# Patient Record
Sex: FEMALE | NOT HISPANIC OR LATINO | Employment: STUDENT | URBAN - METROPOLITAN AREA
[De-identification: names, ages, dates, MRNs, and addresses within clinical notes are randomized per-mention and may not be internally consistent; named-entity substitution may affect disease eponyms.]

---

## 2022-04-26 ENCOUNTER — EVALUATION (OUTPATIENT)
Dept: PHYSICAL THERAPY | Facility: CLINIC | Age: 22
End: 2022-04-26
Payer: COMMERCIAL

## 2022-04-26 DIAGNOSIS — M54.50 ACUTE BILATERAL LOW BACK PAIN WITHOUT SCIATICA: Primary | ICD-10-CM

## 2022-04-26 PROCEDURE — 97162 PT EVAL MOD COMPLEX 30 MIN: CPT | Performed by: PHYSICAL THERAPIST

## 2022-04-26 RX ORDER — QUETIAPINE FUMARATE 200 MG/1
200 TABLET, FILM COATED ORAL
COMMUNITY

## 2022-04-26 RX ORDER — IBUPROFEN 200 MG
TABLET ORAL EVERY 6 HOURS PRN
COMMUNITY

## 2022-04-26 RX ORDER — ESCITALOPRAM OXALATE 10 MG/1
10 TABLET ORAL DAILY
COMMUNITY

## 2022-04-26 NOTE — LETTER
2022    Norbert Richard MD  [address] [place] [id number]    Patient: Maria A Bright   YOB: 2000   Date of Visit: 2022     Encounter Diagnosis     ICD-10-CM    1  Acute bilateral low back pain without sciatica  M54 50        Dear Dr Pilar Porras:    Thank you for your recent referral of Maria A Bright  Please review the attached evaluation summary from Aleena's recent visit  Please verify that you agree with the plan of care by signing the attached order  If you have any questions or concerns, please do not hesitate to call  I sincerely appreciate the opportunity to share in the care of one of your patients and hope to have another opportunity to work with you in the near future  Sincerely,    Lisa Peterson PT      Referring Provider:      I certify that I have read the below Plan of Care and certify the need for these services furnished under this plan of treatment while under my care  Norbert Richard MD  [address] [place] [id number]  Via Fax: [phone]          PT Evaluation     Today's date: 2022  Patient name: Maria A Bright  : 2000  MRN: [id number]  Referring provider: Mariella Gomez MD  Dx:   Encounter Diagnosis     ICD-10-CM    1  Acute bilateral low back pain without sciatica  M54 50        Start Time: 915  Stop Time: 1000  Total time in clinic (min): 45 minutes    Assessment  Assessment details: Maria A Bright is a pleasant 25 y o  female who presents with low back pain, increased with sitting and studying  The primary movement problem is lumbar hypomobility resulting in pain and limiting her ability to sit and study without limitation  No further referral appears necessary at this time based upon examination results  I expect she will be able to meet ling term goals at time of discharge pending compliance with HEP and PT POC  The patient's greatest concerns are the pain she is experiencing and worry over not knowing what's wrong  Problem List:  1) lumbar hypomobility  2) low back pain    Etiologic factors include recent increase in studying  Impairments: abnormal coordination, abnormal muscle tone, abnormal or restricted ROM, activity intolerance, impaired physical strength and lacks appropriate home exercise program    Symptom irritability: moderatePrognosis details: Positive prognostic indicators include positive attitude toward recovery, good understanding of diagnosis and treatment plan options, absence of peripheralization and absence of observed red flags  Negative prognostic indicators include limited duration of treatment due to patient leaving for [facility] for the summer in a few weeks        Goals  (STG) Impairment Goals 4-6 weeks  - Decrease pain to <1/10  - Improve lumbar AROM to 100%  - Increase hip strength to 4+/5 throughout  - Increase core strength to be able to sit straight up without deviation    (LTG) Functional Goals 6-8 weeks  - Return to Prior Level of Function  - Increase Functional Status Measure (FOTO) to: predicted outcome  - Patient will be independent with HEP  - Patient will be able to sit without increased pain/compensation/difficulty  - Patient will be able to perform sit to stand without increased pain/compensation/difficulty   - Patient will be able to bend forward without increased pain/compensation/difficulty   - Patient will have full loaded extension without pain  - Patient will be able to study without pain      Plan  Patient would benefit from: skilled physical therapy  Referral necessary: No  Planned modality interventions: cryotherapy, TENS and thermotherapy: hydrocollator packs  Planned therapy interventions: abdominal trunk stabilization, flexibility, functional ROM exercises, home exercise program, therapeutic exercise, therapeutic activities, stretching, strengthening, patient education, massage, manual therapy and joint mobilization  Frequency: 2x week  Duration in weeks: 6  Treatment plan discussed with: patient        Subjective Evaluation    History of Present Illness  Mechanism of injury: 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[employer], she is teresa   HOME LIFE: Patient is from [place] but plans to move back to [facility] when the ester when she is done   PLOF/History: About a month ago, her back started hurting very minimally and then it would go away  More recently, it has gotten worse, 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  Pain primarily stays at her back, but sometimes she has some numbness in her R knee and R upper back near her shoulder  PAIN LOCATION/DESCRIPTORS: central low back, described as nagging and bearable   AGGRAVATING FACTORS: sitting, lying on a soft surface, bending forward    EASES: Ibuprofen 3x/day, lying on her back on a firm surface, standing   DAY PATTERN: pain everyday  IMAGING: none  SPECIAL QUESTIONS: denies a feeling of weakness in legs   Charles Net denies a new onset of Bladder incontinence, Bowel dysfunction, Recent unexplained weight loss, Constant night pain and History of cancer  PATIENT GOALS: pain relief, be able to do her school work with less pain    Pain  Current pain ratin  At best pain ratin  At worst pain ratin  Quality: throbbing and dull ache  Relieving factors: rest, relaxation, medications and change in position  Aggravating factors: sitting  Progression: worsening    Patient Goals  Patient goals for therapy: decreased pain          Objective     Tenderness     Lumbar Spine  Tenderness in the spinous process (L3-5)       Additional Tenderness Details  R iliopsoas has increased tone, improved with grade 5 thoracic mobilization    Neurological Testing     Sensation     Lumbar   Left   Intact: light touch    Right   Intact: light touch    Reflexes   Left   Patellar (L4): normal (2+)  Achilles (S1): normal (2+)    Right   Patellar (L4): normal (2+)  Achilles (S1): normal (2+)    Active Range of Motion     Additional Active Range of Motion Details  LS AROM:   Flexion: 75%  Extension: 25%  SideBending right: 50%  SideBending left: 50%  Rotation right: 50%  Rotation left: 50%    Decreased curve reversal of lumbar spine with flexion  Limited loaded extension due to fear of movement      Muscle Activation   Patient unable to activate left multifidus and right multifidus                Diagnosis: Low Back Pain   Precautions: none   Primary Goals: Be able to sit and study without pain   *asterisks by exercise = given for HEP   Manuals 4/26       Grade 5 Thoracic PA mobilization LB                                       There Ex        TM/elliptical        Open books        LTR        Multifidus press        bird-dogs        Bridges from p-ODEC                                Neuro Re-Ed        Pelvic tilts        Multifidus activation* 10" 10x       Pelvic clocks on p-ODEC        abd bracing                                                                 Education Studying in extension position, lumbar roll        Re-evaluation              Ther Act                                         Modalities

## 2022-04-26 NOTE — LETTER
2022    Emy Maya MD  [address] [id number]    Patient: Odalys Topete   YOB: 2000   Date of Visit: 2022     Encounter Diagnosis     ICD-10-CM    1  Acute bilateral low back pain without sciatica  M54 50        Dear Dr Pitt Bending:    Thank you for your recent referral of Odalys Topete  Please review the attached evaluation summary from Aleena's recent visit  Please verify that you agree with the plan of care by signing the attached order  If you have any questions or concerns, please do not hesitate to call  I sincerely appreciate the opportunity to share in the care of one of your patients and hope to have another opportunity to work with you in the near future  Sincerely,    Jose Liu PT      Referring Provider:      I certify that I have read the below Plan of Care and certify the need for these services furnished under this plan of treatment while under my care  Emy Maya MD  [address] [facility]  Via Fax: [phone]          PT Evaluation     Today's date: 2022  Patient name: Odalys Topete  : 2000  MRN: [id number]  Referring provider: Cristhian Duarte MD  Dx:   Encounter Diagnosis     ICD-10-CM    1  Acute bilateral low back pain without sciatica  M54 50        Start Time: 0915  Stop Time: 1000  Total time in clinic (min): 45 minutes    Assessment  Assessment details: Odalys Topete is a pleasant 25 y o  female who presents with low back pain, increased with sitting and studying  The primary movement problem is lumbar hypomobility resulting in pain and limiting her ability to sit and study without limitation  No further referral appears necessary at this time based upon examination results  I expect she will be able to meet ling term goals at time of discharge pending compliance with HEP and PT POC    The patient's greatest concerns are the pain she is experiencing and worry over not knowing what's wrong  Problem List:  1) lumbar hypomobility  2) low back pain    Etiologic factors include recent increase in studying  Impairments: abnormal coordination, abnormal muscle tone, abnormal or restricted ROM, activity intolerance, impaired physical strength and lacks appropriate home exercise program    Symptom irritability: moderatePrognosis details: Positive prognostic indicators include positive attitude toward recovery, good understanding of diagnosis and treatment plan options, absence of peripheralization and absence of observed red flags  Negative prognostic indicators include limited duration of treatment due to patient leaving for [facility] for the summer in a few weeks        Goals  (STG) Impairment Goals 4-6 weeks  - Decrease pain to <1/10  - Improve lumbar AROM to 100%  - Increase hip strength to 4+/5 throughout  - Increase core strength to be able to sit straight up without deviation    (LTG) Functional Goals 6-8 weeks  - Return to Prior Level of Function  - Increase Functional Status Measure (FOTO) to: predicted outcome  - Patient will be independent with HEP  - Patient will be able to sit without increased pain/compensation/difficulty  - Patient will be able to perform sit to stand without increased pain/compensation/difficulty   - Patient will be able to bend forward without increased pain/compensation/difficulty   - Patient will have full loaded extension without pain  - Patient will be able to study without pain      Plan  Patient would benefit from: skilled physical therapy  Referral necessary: No  Planned modality interventions: cryotherapy, TENS and thermotherapy: hydrocollator packs  Planned therapy interventions: abdominal trunk stabilization, flexibility, functional ROM exercises, home exercise program, therapeutic exercise, therapeutic activities, stretching, strengthening, patient education, massage, manual therapy and joint mobilization  Frequency: 2x week  Duration in weeks: 6  Treatment plan discussed with: patient        Subjective Evaluation    History of Present Illness  Mechanism of injury: 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[employer], she is teresa   HOME LIFE: Patient is from [place] but plans to move back to [place] when the semester when she is done   PLOF/History: About a month ago, her back started hurting very minimally and then it would go away  More recently, it has gotten worse, 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  Pain primarily stays at her back, but sometimes she has some numbness in her R knee and R upper back near her shoulder  PAIN LOCATION/DESCRIPTORS: central low back, described as nagging and bearable   AGGRAVATING FACTORS: sitting, lying on a soft surface, bending forward    EASES: Ibuprofen 3x/day, lying on her back on a firm surface, standing   DAY PATTERN: pain everyday  IMAGING: none  SPECIAL QUESTIONS: denies a feeling of weakness in legs   Joaquin Aid denies a new onset of Bladder incontinence, Bowel dysfunction, Recent unexplained weight loss, Constant night pain and History of cancer  PATIENT GOALS: pain relief, be able to do her school work with less pain    Pain  Current pain ratin  At best pain ratin  At worst pain ratin  Quality: throbbing and dull ache  Relieving factors: rest, relaxation, medications and change in position  Aggravating factors: sitting  Progression: worsening    Patient Goals  Patient goals for therapy: decreased pain          Objective     Tenderness     Lumbar Spine  Tenderness in the spinous process (L3-5)       Additional Tenderness Details  R iliopsoas has increased tone, improved with grade 5 thoracic mobilization    Neurological Testing     Sensation     Lumbar   Left   Intact: light touch    Right   Intact: light touch    Reflexes   Left   Patellar (L4): normal (2+)  Achilles (S1): normal (2+)    Right   Patellar (L4): normal (2+)  Achilles (S1): normal (2+)    Active Range of Motion Additional Active Range of Motion Details  LS AROM:   Flexion: 75%  Extension: 25%  SideBending right: 50%  SideBending left: 50%  Rotation right: 50%  Rotation left: 50%    Decreased curve reversal of lumbar spine with flexion  Limited loaded extension due to fear of movement      Muscle Activation   Patient unable to activate left multifidus and right multifidus                Diagnosis: Low Back Pain   Precautions: none   Primary Goals: Be able to sit and study without pain   *asterisks by exercise = given for HEP   Manuals 4/26       Grade 5 Thoracic PA mobilization LB                                       There Ex        TM/elliptical        Open books        LTR        Multifidus press        bird-dogs        Bridges from p-ball                                Neuro Re-Ed        Pelvic tilts        Multifidus activation* 10" 10x       Pelvic clocks on p-ball        abd bracing                                                                 Education Studying in extension position, lumbar roll        Re-evaluation              Ther Act                                         Modalities

## 2022-04-26 NOTE — PROGRESS NOTES
PT Evaluation     Today's date: 2022  Patient name: Radha Wheeler  : 2000  MRN: [id number]  Referring provider: Dianne Correia MD  Dx:   Encounter Diagnosis     ICD-10-CM    1  Acute bilateral low back pain without sciatica  M54 50        Start Time: 915  Stop Time: 1000  Total time in clinic (min): 45 minutes    Assessment  Assessment details: Radha Wheeler is a pleasant 25 y o  female who presents with low back pain, increased with sitting and studying  The primary movement problem is lumbar hypomobility resulting in pain and limiting her ability to sit and study without limitation  No further referral appears necessary at this time based upon examination results  I expect she will be able to meet ling term goals at time of discharge pending compliance with HEP and PT POC  The patient's greatest concerns are the pain she is experiencing and worry over not knowing what's wrong  Problem List:  1) lumbar hypomobility  2) low back pain    Etiologic factors include recent increase in studying  Impairments: abnormal coordination, abnormal muscle tone, abnormal or restricted ROM, activity intolerance, impaired physical strength and lacks appropriate home exercise program    Symptom irritability: moderatePrognosis details: Positive prognostic indicators include positive attitude toward recovery, good understanding of diagnosis and treatment plan options, absence of peripheralization and absence of observed red flags  Negative prognostic indicators include limited duration of treatment due to patient leaving for [facility] for the summer in a few weeks        Goals  (STG) Impairment Goals 4-6 weeks  - Decrease pain to <1/10  - Improve lumbar AROM to 100%  - Increase hip strength to 4+/5 throughout  - Increase core strength to be able to sit straight up without deviation    (LTG) Functional Goals 6-8 weeks  - Return to Prior Level of Function  - Increase Functional Status Measure (FOTO) to: predicted outcome  - Patient will be independent with HEP  - Patient will be able to sit without increased pain/compensation/difficulty  - Patient will be able to perform sit to stand without increased pain/compensation/difficulty   - Patient will be able to bend forward without increased pain/compensation/difficulty   - Patient will have full loaded extension without pain  - Patient will be able to study without pain      Plan  Patient would benefit from: skilled physical therapy  Referral necessary: No  Planned modality interventions: cryotherapy, TENS and thermotherapy: hydrocollator packs  Planned therapy interventions: abdominal trunk stabilization, flexibility, functional ROM exercises, home exercise program, therapeutic exercise, therapeutic activities, stretching, strengthening, patient education, massage, manual therapy and joint mobilization  Frequency: 2x week  Duration in weeks: 6  Treatment plan discussed with: patient        Subjective Evaluation    History of Present Illness  Mechanism of injury: 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[employer], she is teresa   HOME LIFE: Patient is from [place] but plans to move back to [place] when the semester when she is done   PLOF/History: About a month ago, her back started hurting very minimally and then it would go away  More recently, it has gotten worse, 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  Pain primarily stays at her back, but sometimes she has some numbness in her R knee and R upper back near her shoulder     PAIN LOCATION/DESCRIPTORS: central low back, described as nagging and bearable   AGGRAVATING FACTORS: sitting, lying on a soft surface, bending forward    EASES: Ibuprofen 3x/day, lying on her back on a firm surface, standing   DAY PATTERN: pain everyday  IMAGING: none  SPECIAL QUESTIONS: denies a feeling of weakness in legs   Jeannie Evans denies a new onset of Bladder incontinence, Bowel dysfunction, Recent unexplained weight loss, Constant night pain and History of cancer  PATIENT GOALS: pain relief, be able to do her school work with less pain    Pain  Current pain ratin  At best pain ratin  At worst pain ratin  Quality: throbbing and dull ache  Relieving factors: rest, relaxation, medications and change in position  Aggravating factors: sitting  Progression: worsening    Patient Goals  Patient goals for therapy: decreased pain          Objective     Tenderness     Lumbar Spine  Tenderness in the spinous process (L3-5)  Additional Tenderness Details  R iliopsoas has increased tone, improved with grade 5 thoracic mobilization    Neurological Testing     Sensation     Lumbar   Left   Intact: light touch    Right   Intact: light touch    Reflexes   Left   Patellar (L4): normal (2+)  Achilles (S1): normal (2+)    Right   Patellar (L4): normal (2+)  Achilles (S1): normal (2+)    Active Range of Motion     Additional Active Range of Motion Details  LS AROM:   Flexion: 75%  Extension: 25%  SideBending right: 50%  SideBending left: 50%  Rotation right: 50%  Rotation left: 50%    Decreased curve reversal of lumbar spine with flexion  Limited loaded extension due to fear of movement      Muscle Activation   Patient unable to activate left multifidus and right multifidus                Diagnosis: Low Back Pain   Precautions: none   Primary Goals: Be able to sit and study without pain   *asterisks by exercise = given for HEP   Manuals        Grade 5 Thoracic PA mobilization LB                                       There Ex        TM/elliptical        Open books        LTR        Multifidus press        bird-dogs        Bridges from p-ball                                Neuro Re-Ed        Pelvic tilts        Multifidus activation* 10" 10x       Pelvic clocks on p-ball        abd bracing                                                                 Education Studying in extension position, lumbar roll        Re-evaluation              Ther Act Modalities

## 2022-04-27 ENCOUNTER — TRANSCRIBE ORDERS (OUTPATIENT)
Dept: PHYSICAL THERAPY | Facility: CLINIC | Age: 22
End: 2022-04-27

## 2022-04-27 DIAGNOSIS — M54.50 ACUTE BILATERAL LOW BACK PAIN WITHOUT SCIATICA: Primary | ICD-10-CM

## 2022-04-29 ENCOUNTER — APPOINTMENT (OUTPATIENT)
Dept: RADIOLOGY | Facility: CLINIC | Age: 22
End: 2022-04-29
Payer: COMMERCIAL

## 2022-04-29 ENCOUNTER — OFFICE VISIT (OUTPATIENT)
Dept: PHYSICAL THERAPY | Facility: CLINIC | Age: 22
End: 2022-04-29
Payer: COMMERCIAL

## 2022-04-29 DIAGNOSIS — M54.50 ACUTE BILATERAL LOW BACK PAIN WITHOUT SCIATICA: Primary | ICD-10-CM

## 2022-04-29 DIAGNOSIS — M54.50 ACUTE BILATERAL LOW BACK PAIN WITHOUT SCIATICA: ICD-10-CM

## 2022-04-29 PROCEDURE — 97110 THERAPEUTIC EXERCISES: CPT | Performed by: PHYSICAL THERAPIST

## 2022-04-29 PROCEDURE — 97112 NEUROMUSCULAR REEDUCATION: CPT | Performed by: PHYSICAL THERAPIST

## 2022-04-29 PROCEDURE — 97140 MANUAL THERAPY 1/> REGIONS: CPT | Performed by: PHYSICAL THERAPIST

## 2022-04-29 PROCEDURE — 72110 X-RAY EXAM L-2 SPINE 4/>VWS: CPT

## 2022-04-29 NOTE — PROGRESS NOTES
Daily Note     Today's date: 2022  Patient name: Colten King  : 2000  MRN: [id number]  Referring provider: Gloria Miramontes MD  Dx:   Encounter Diagnosis     ICD-10-CM    1  Acute bilateral low back pain without sciatica  M54 50        Start Time: 1515  Stop Time: 1600  Total time in clinic (min): 45 minutes    Subjective: Patient reports that her back is feeling better since last session  Says that she is able to sit and study for longer without pain when she uses the towel roll  Says she still gets some pain when she sits for a very long time without moving  Feeling better overall  Objective: See treatment diary below      Assessment: Tolerated treatment well  Patient demonstrated fatigue post treatment, exhibited good technique with therapeutic exercises and would benefit from continued PT  Patient has improved in low back pain since last session, and notes even greater improvement post session today  Added in some core stabilization exercises today which were tolerated well  Will continue to progress as tolerated  Plan: Continue per plan of care  Progress treatment as tolerated         Diagnosis: Low Back Pain   Precautions: none   Primary Goals: Be able to sit and study without pain   *asterisks by exercise = given for HEP   Manuals       Grade 5 Thoracic PA mobilization LB       Gr 3 lumbar mobilizations in prone  LB                              There Ex        TM/elliptical  Elliptical 5 mins      Open books  15x ea      LTR  20x      Multifidus press  8# with walkout 2 laps ea      bird-dogs  2x10      Bridges from p-ball  5" 20x                               Neuro Re-Ed        Pelvic tilts        Multifidus activation* 10" 10x       Pelvic clocks on p-ball        abd bracing                                                                 Education Studying in extension position, lumbar roll        Re-evaluation              Ther Act Modalities

## 2022-05-03 ENCOUNTER — OFFICE VISIT (OUTPATIENT)
Dept: PHYSICAL THERAPY | Facility: CLINIC | Age: 22
End: 2022-05-03
Payer: COMMERCIAL

## 2022-05-03 DIAGNOSIS — M54.50 ACUTE BILATERAL LOW BACK PAIN WITHOUT SCIATICA: Primary | ICD-10-CM

## 2022-05-03 PROCEDURE — 97140 MANUAL THERAPY 1/> REGIONS: CPT | Performed by: PHYSICAL THERAPIST

## 2022-05-03 PROCEDURE — 97110 THERAPEUTIC EXERCISES: CPT | Performed by: PHYSICAL THERAPIST

## 2022-05-03 NOTE — PROGRESS NOTES
Daily Note     Today's date: 5/3/2022  Patient name: Tom Lo  : 2000  MRN: [id number]  Referring provider: Angela Davies MD  Dx:   Encounter Diagnosis     ICD-10-CM    1  Acute bilateral low back pain without sciatica  M54 50        Start Time: 1000  Stop Time: 1045  Total time in clinic (min): 45 minutes    Subjective: Patient reports that she is feeling a lot better overall and that she felt good following last session  Objective: See treatment diary below      Assessment: Tolerated treatment well  Patient demonstrated fatigue post treatment and would benefit from continued PT  Patient was able to demonstrate improvements in core activation and strength with exercise progressions today  Will continue to progress as able  Plan: Continue per plan of care  Progress treatment as tolerated         Diagnosis: Low Back Pain   Precautions: none   Primary Goals: Be able to sit and study without pain   *asterisks by exercise = given for HEP   Manuals  5/3     Grade 5 Thoracic PA mobilization LB       Gr 3 lumbar mobilizations in prone  LB LB                             There Ex        TM/elliptical  Elliptical 5 mins Elliptical 5 mins     Open books  15x ea 15x ea     LTR  20x 20x     Multifidus press  8# with walkout 2 laps ea NV     bird-dogs  2x10 2x10     Bridges from p-ball  5" 20x  5" 20x     planks   NV     X-walks   NV     Alt leg extensions   NV     Neuro Re-Ed        Pelvic tilts  20x 20x      Multifidus activation* 10" 10x       Pelvic clocks on p-ball  10x      abd bracing with alt leg extensions   2x10                                                              Education Studying in extension position, lumbar roll        Re-evaluation              Ther Act                                         Modalities

## 2022-05-06 ENCOUNTER — APPOINTMENT (OUTPATIENT)
Dept: PHYSICAL THERAPY | Facility: CLINIC | Age: 22
End: 2022-05-06
Payer: COMMERCIAL

## 2022-05-10 ENCOUNTER — OFFICE VISIT (OUTPATIENT)
Dept: PHYSICAL THERAPY | Facility: CLINIC | Age: 22
End: 2022-05-10
Payer: COMMERCIAL

## 2022-05-10 DIAGNOSIS — M54.50 ACUTE BILATERAL LOW BACK PAIN WITHOUT SCIATICA: Primary | ICD-10-CM

## 2022-05-10 PROCEDURE — 97110 THERAPEUTIC EXERCISES: CPT

## 2022-05-10 PROCEDURE — 97112 NEUROMUSCULAR REEDUCATION: CPT

## 2022-05-10 NOTE — PROGRESS NOTES
Daily Note     Today's date: 5/10/2022  Patient name: Cesar Camilo  : 2000  MRN: [id number]  Referring provider: Kathy Rao MD  Dx:   Encounter Diagnosis     ICD-10-CM    1  Acute bilateral low back pain without sciatica  M54 50                   Subjective: Patient reports her back has been starting to feel better  She denies significant pain at beginning of session and states she has only had significant pain when bending over recently  Objective: See treatment diary below      Assessment: Tolerated treatment well with no reports of increased back pain  Patient required moderate verbal cues to perform TE with appropriate technique and intensity  Patient most challenged by multifidus press with walkout today  Patient would benefit from continued PT to increase trunk mobility and core strength for improved function in ADLs  Plan: Continue per plan of care        Diagnosis: Low Back Pain   Precautions: none   Primary Goals: Be able to sit and study without pain   *asterisks by exercise = given for HEP   Manuals 4/26 4/29 5/3 5/10    Grade 5 Thoracic PA mobilization LB       Gr 3 lumbar mobilizations in prone  LB LB                             There Ex        TM/elliptical  Elliptical 5 mins Elliptical 5 mins Elliptical 5 mins    Open books  15x ea 15x ea 15x ea    LTR  20x 20x 20x    Multifidus press  8# with walkout 2 laps ea NV 10# with walkout 3 laps ea    bird-dogs  2x10 2x10 2x10    Bridges from p-ball  5" 20x  5" 20x 5" x30    planks   NV Side half planks 3x10" ea    X-walks   NV Green 3 laps    Alt leg extensions   NV 20x ea    Neuro Re-Ed        Pelvic tilts  20x 20x  5"x20    PPT bicycle c ball    3x max    Multifidus activation* 10" 10x       Pelvic clocks on p-ball  10x      abd bracing with alt leg extensions   2x10                                                              Education Studying in extension position, lumbar roll        Re-evaluation              Ther Act Modalities

## 2022-05-13 ENCOUNTER — APPOINTMENT (OUTPATIENT)
Dept: PHYSICAL THERAPY | Facility: CLINIC | Age: 22
End: 2022-05-13
Payer: COMMERCIAL

## 2022-09-09 ENCOUNTER — TRANSCRIBE ORDERS (OUTPATIENT)
Dept: URGENT CARE | Facility: CLINIC | Age: 22
End: 2022-09-09

## 2022-09-09 ENCOUNTER — HOSPITAL ENCOUNTER (OUTPATIENT)
Dept: RADIOLOGY | Facility: HOSPITAL | Age: 22
Discharge: HOME | End: 2022-09-09
Payer: COMMERCIAL

## 2022-09-09 ENCOUNTER — APPOINTMENT (OUTPATIENT)
Dept: RADIOLOGY | Facility: CLINIC | Age: 22
End: 2022-09-09
Payer: COMMERCIAL

## 2022-09-09 DIAGNOSIS — M25.571 ACUTE RIGHT ANKLE PAIN: ICD-10-CM

## 2022-09-09 DIAGNOSIS — M25.571 ACUTE RIGHT ANKLE PAIN: Primary | ICD-10-CM

## 2022-09-09 PROCEDURE — 73610 X-RAY EXAM OF ANKLE: CPT

## 2022-11-24 ENCOUNTER — HOSPITAL ENCOUNTER (EMERGENCY)
Facility: HOSPITAL | Age: 22
Discharge: HOME | End: 2022-11-25
Attending: EMERGENCY MEDICINE

## 2022-11-24 VITALS
DIASTOLIC BLOOD PRESSURE: 92 MMHG | RESPIRATION RATE: 18 BRPM | HEART RATE: 78 BPM | OXYGEN SATURATION: 98 % | SYSTOLIC BLOOD PRESSURE: 131 MMHG | TEMPERATURE: 98.4 F

## 2022-11-24 DIAGNOSIS — S05.02XA ABRASION OF LEFT CORNEA, INITIAL ENCOUNTER: Primary | ICD-10-CM

## 2022-11-24 RX ORDER — TETRACAINE HYDROCHLORIDE 5 MG/ML
1 SOLUTION OPHTHALMIC ONCE
Status: COMPLETED | OUTPATIENT
Start: 2022-11-24 | End: 2022-11-24

## 2022-11-24 RX ADMIN — FLUORESCEIN SODIUM 1 STRIP: 1 STRIP OPHTHALMIC at 23:31

## 2022-11-24 RX ADMIN — TETRACAINE HYDROCHLORIDE 1 DROP: 5 SOLUTION OPHTHALMIC at 23:31

## 2022-11-25 RX ORDER — ERYTHROMYCIN 5 MG/G
OINTMENT OPHTHALMIC
Qty: 1 G | Refills: 0 | Status: SHIPPED | OUTPATIENT
Start: 2022-11-25

## 2022-11-25 RX ORDER — ERYTHROMYCIN 5 MG/G
0.5 OINTMENT OPHTHALMIC ONCE
Status: COMPLETED | OUTPATIENT
Start: 2022-11-25 | End: 2022-11-25

## 2022-11-25 RX ORDER — KETOROLAC TROMETHAMINE 5 MG/ML
1 SOLUTION OPHTHALMIC 4 TIMES DAILY PRN
Qty: 0.6 ML | Refills: 0 | Status: SHIPPED | OUTPATIENT
Start: 2022-11-25 | End: 2022-11-28

## 2022-11-25 RX ADMIN — ERYTHROMYCIN 0.5 INCH: 5 OINTMENT OPHTHALMIC at 00:16

## 2022-11-25 NOTE — DISCHARGE INSTRUCTIONS
Follow-up with ophthalmology  Use the prescribed medications as directed  Please return to the emergency department if you develop worsening symptoms, severe pain, changes in your vision, or anything else concerning to you

## 2022-11-25 NOTE — ED PROVIDER NOTES
History  Chief Complaint   Patient presents with   • Foreign Body in Eye     Patient states that this morning she was putting on permanent lash extensions and accidentally got glue in her left eye  Patient states she has pain when opening and closing her eye      51-year-old female with no pertinent past medical history who presents for evaluation of left eye pain  Patient reports that she was attempting to place eyelash extensions this morning when 1 of the lashes fell into her left eye  She was able to remove the lash but she has had pain in the eye since that time  Her vision has been normal   She tried rinsing the eye without relief  She has not had any photophobia  She otherwise feels at baseline  Prior to Admission Medications   Prescriptions Last Dose Informant Patient Reported? Taking? QUEtiapine (SEROquel) 200 mg tablet   Yes No   Sig: Take 200 mg by mouth daily at bedtime   escitalopram (LEXAPRO) 10 mg tablet   Yes No   Sig: Take 10 mg by mouth daily   ibuprofen (MOTRIN) 200 mg tablet   Yes No   Sig: Take by mouth every 6 (six) hours as needed for mild pain      Facility-Administered Medications: None       No past medical history on file  No past surgical history on file  No family history on file  I have reviewed and agree with the history as documented  E-Cigarette/Vaping     E-Cigarette/Vaping Substances     Social History     Tobacco Use   • Smoking status: Unknown   Substance Use Topics   • Alcohol use: Not Currently   • Drug use: Never       Review of Systems   Constitutional: Negative for chills and fever  HENT: Negative for congestion and sore throat  Eyes: Positive for pain  Negative for visual disturbance  Respiratory: Negative for chest tightness and shortness of breath  Cardiovascular: Negative for chest pain and leg swelling  Gastrointestinal: Negative for abdominal pain, nausea and vomiting     Genitourinary: Negative for difficulty urinating and flank pain    Musculoskeletal: Negative for back pain and gait problem  Skin: Negative for pallor and rash  Neurological: Negative for weakness and headaches  All other systems reviewed and are negative  Physical Exam  Physical Exam  Vitals and nursing note reviewed  Constitutional:       General: She is not in acute distress  Appearance: She is well-developed and well-nourished  She is not ill-appearing  HENT:      Head: Normocephalic and atraumatic  Nose: Nose normal       Mouth/Throat:      Mouth: Oropharynx is clear and moist  Mucous membranes are moist    Eyes:      General: Lids are normal  Lids are everted, no foreign bodies appreciated  Vision grossly intact  Left eye: No foreign body or discharge  Extraocular Movements: Extraocular movements intact and EOM normal       Conjunctiva/sclera:      Left eye: Left conjunctiva is injected  Pupils: Pupils are equal, round, and reactive to light  Comments: Mild injection of the conjunctiva of the left eye  No visible foreign bodies  Cardiovascular:      Rate and Rhythm: Normal rate  Pulmonary:      Effort: Pulmonary effort is normal    Abdominal:      General: There is no distension  Musculoskeletal:         General: No edema  Normal range of motion  Cervical back: Normal range of motion and neck supple  Skin:     General: Skin is warm and dry  Findings: No rash  Neurological:      General: No focal deficit present  Mental Status: She is alert and oriented to person, place, and time     Psychiatric:         Mood and Affect: Mood and affect normal          Behavior: Behavior normal          Vital Signs  ED Triage Vitals   Temperature Pulse Respirations Blood Pressure SpO2   11/24/22 2317 11/24/22 2313 11/24/22 2313 11/24/22 2315 11/24/22 2313   98 4 °F (36 9 °C) 78 18 131/92 98 %      Temp Source Heart Rate Source Patient Position - Orthostatic VS BP Location FiO2 (%)   11/24/22 2317 11/24/22 2313 11/24/22 2313 11/24/22 2313 --   Oral Monitor Lying Left arm       Pain Score       --                  Vitals:    11/24/22 2313 11/24/22 2315   BP:  131/92   Pulse: 78    Patient Position - Orthostatic VS: Lying Lying         Visual Acuity      ED Medications  Medications   fluorescein sodium sterile ophthalmic strip 1 strip (1 strip Left Eye Given by Other 11/24/22 2331)   tetracaine 0 5 % ophthalmic solution 1 drop (1 drop Left Eye Given 11/24/22 2331)   erythromycin (ILOTYCIN) 0 5 % ophthalmic ointment 0 5 inch (0 5 inches Left Eye Given 11/25/22 0016)       Diagnostic Studies  Results Reviewed     None                 No orders to display              Procedures  Procedures         ED Course                                             MDM  Number of Diagnoses or Management Options  Abrasion of left cornea, initial encounter: new and requires workup  Diagnosis management comments: 80-year-old female who presents for left eye pain  Corneal abrasion noted on fluorescein staining  Treat with erythromycin ointment  Patient does not wear contact lenses  Advised follow-up with ophthalmology  Return precautions discussed  Amount and/or Complexity of Data Reviewed  Review and summarize past medical records: yes    Risk of Complications, Morbidity, and/or Mortality  Presenting problems: high  Diagnostic procedures: low  Management options: low    Patient Progress  Patient progress: stable      Disposition  Final diagnoses:   Abrasion of left cornea, initial encounter     Time reflects when diagnosis was documented in both MDM as applicable and the Disposition within this note     Time User Action Codes Description Comment    11/25/2022 12:06 AM Kath Moore Add [S05  02XA] Abrasion of left cornea, initial encounter       ED Disposition     ED Disposition   Discharge    Condition   Stable    Date/Time   Fri Nov 25, 2022 12:06 AM    Comment   Shannon Car discharge to home/self care                 Follow-up Information     Follow up With Specialties Details Why Contact Info    Neymar Williamson MD Ophthalmology Schedule an appointment as soon as possible for a visit   [address]  [phone] [phone]  [phone]            Patient's Medications   Discharge Prescriptions    ERYTHROMYCIN (ILOTYCIN) OPHTHALMIC OINTMENT    Place a 1/2 inch ribbon of ointment into the lower eyelid four times daily for 5 days  Start Date: 11/25/2022End Date: --       Order Dose: --       Quantity: 1 g    Refills: 0    KETOROLAC (ACULAR) 0 5 % OPHTHALMIC SOLUTION    Administer 1 drop into the left eye 4 (four) times a day as needed (pain) for up to 3 days       Start Date: 11/25/2022End Date: 11/28/2022       Order Dose: 1 drop       Quantity: 0 6 mL    Refills: 0       No discharge procedures on file      PDMP Review     None          ED Provider  Electronically Signed by           Denise Morgan MD  11/25/22 6970